# Patient Record
Sex: FEMALE | Race: WHITE | NOT HISPANIC OR LATINO | Employment: OTHER | URBAN - METROPOLITAN AREA
[De-identification: names, ages, dates, MRNs, and addresses within clinical notes are randomized per-mention and may not be internally consistent; named-entity substitution may affect disease eponyms.]

---

## 2022-04-26 ENCOUNTER — MEDICAL CORRESPONDENCE (OUTPATIENT)
Dept: HEALTH INFORMATION MANAGEMENT | Facility: CLINIC | Age: 72
End: 2022-04-26
Payer: OTHER GOVERNMENT

## 2022-05-10 ENCOUNTER — ALLIED HEALTH/NURSE VISIT (OUTPATIENT)
Dept: EDUCATION SERVICES | Facility: CLINIC | Age: 72
End: 2022-05-10
Payer: MEDICARE

## 2022-05-10 VITALS — WEIGHT: 191.8 LBS | BODY MASS INDEX: 35.3 KG/M2 | HEIGHT: 62 IN

## 2022-05-10 DIAGNOSIS — E11.9 TYPE 2 DIABETES MELLITUS (H): Primary | ICD-10-CM

## 2022-05-10 DIAGNOSIS — E66.9 OBESITY: ICD-10-CM

## 2022-05-10 PROBLEM — E78.1 PURE HYPERTRIGLYCERIDEMIA: Status: ACTIVE | Noted: 2022-05-10

## 2022-05-10 PROBLEM — E66.01 MORBID OBESITY (H): Status: ACTIVE | Noted: 2022-05-10

## 2022-05-10 PROCEDURE — G0108 DIAB MANAGE TRN  PER INDIV: HCPCS | Performed by: DIETITIAN, REGISTERED

## 2022-05-10 NOTE — LETTER
"    5/10/2022         RE: Tanja Conway  [address] [facility] [address] [place] [id number]        Dear Colleague,    Thank you for referring your patient, Tanja Conway, to the [facility]. Please see a copy of my visit note below.    Diabetes Self-Management Education & Support    Presents for: Individual review    Patient presents today with Diabetes type 2 with a complication of CKD, which is at stage 3a.   Patient also has diagnosis of obesity with BMI 35    Pt states it has been many years since diabetes education.      SUBJECTIVE/OBJECTIVE:  Presents for: Individual review  Accompanied by: Self  Diabetes education in the past 24mo: No  Focus of Visit: Monitoring, Reducing Risks, Taking Medication, Healthy Coping, Healthy Eating, Problem Solving, Being Active, Assistance w/ making life changes, Self-care behavioral goal setting  Diabetes type: Type 2  Disease course: Stable  How confident are you filling out medical forms by yourself:: Extremely  Transportation concerns: No  Difficulty affording diabetes medication?: No  Difficulty affording diabetes testing supplies?: No  Other concerns:: None  Cultural Influences/Ethnic Background:  Not  or     Diabetes Symptoms & Complications:  Fatigue: Yes (Fibromyalgia)  Neuropathy: No  Polydipsia: No  Polyphagia: No  Polyuria: No  Visual change: No  Slow healing wounds: No  Other: No  Symptom course: Stable  Weight trend: Increasing  Complications assessed today?: Yes  Autonomic neuropathy: No  CVA: No  Heart disease: No  Nephropathy: Yes (CKD Stage 3a)  Peripheral neuropathy: No  Peripheral Vascular Disease: No  Retinopathy: No    Patient Problem List and Family Medical History reviewed for relevant medical history, current medical status, and diabetes risk factors.    Vitals:  Ht 1.575 m (5' 2\")   Wt 87 kg (191 lb 12.8 oz)   BMI 35.08 kg/m    Estimated body mass index is 35.08 kg/m  as calculated from the following:    " "Height as of this encounter: 1.575 m (5' 2\").    Weight as of this encounter: 87 kg (191 lb 12.8 oz).   Last 3 BP:   BP Readings from Last 3 Encounters:   No data found for BP       History   Smoking Status     Former Smoker   Smokeless Tobacco     Not on file     Comment: quit 5 years ago       Labs:  No results found for: A1C  No results found for: GLC  No results found for: LDL  No results found for: HDL]  No results found for: GFRESTIMATED  No results found for: GFRESTBLACK  No results found for: CR  No results found for: MICROALBUMIN    Healthy Eating:  Healthy Eating Assessed Today: Yes  Cultural/Confucianism diet restrictions?: No  Breakfast: skips  Beverages: Water, Milk, Soda    Being Active:  Being Active Assessed Today: Yes  Exercise:: Currently not exercising  Barrier to exercise: None    Monitoring:  Monitoring Assessed Today: Yes  Did patient bring glucose meter to appointment? : No  Blood Glucose Meter: Unknown  Times checking blood sugar at home (number): 1  Times checking blood sugar at home (per): Week    PCP ordered freestyle aliza but this is not a covered typically with Medicare due to patient not being on insulin    Taking Medications:  Metformin 500mg one tab BID  Victoza 1.2mg daily     Taking Medication Assessed Today: Yes    Problem Solving:                 Reducing Risks:       Healthy Coping:     Patient Activation Measure Survey Score:  No flowsheet data found.    Diabetes knowledge and skills assessment:   Patient is knowledgeable in diabetes management concepts related to: Instruction today    Patient needs further education on the following diabetes management concepts: Healthy Eating, Being Active, Monitoring, Taking Medication, Problem Solving, Reducing Risks and Healthy Coping    Based on learning assessment above, most appropriate setting for further diabetes education would be: Individual setting.      INTERVENTIONS:    Education provided today on:  AADE Self-Care " Behaviors:  Diabetes Pathophysiology  Healthy Eating: carbohydrate counting, weight reduction, heart healthy diet, portion control, plate planning method and label reading  Being Active: relationship to blood glucose and describe appropriate activity program  Monitoring: individual blood glucose targets and frequency of monitoring  Taking Medication: action of prescribed medication    Opportunities for ongoing education and support in diabetes-self management were discussed.    Pt verbalized understanding of concepts discussed and recommendations provided today.       Education Materials Provided:  Goals for Your Diabetes Care, Carbohydrate Counting and My Plate Planner      ASSESSMENT:  Patient is willing to adjust some dietary habits to improve glycemic control.  Patient does not have meter or log today for evaluation will not adjust medications at this time unknown A1c     Goals Addressed as of 5/11/2022 at 1:07 PM       Taking Medication     Added 5/11/22 by Lydia Morrissey RD     Patient will take diabetes related medications daily as directed              Patient's most recent No results found for: A1C ? meeting goal of <7.0    PLAN  See Patient Instructions for co-developed, patient-stated behavior change goals.  AVS printed and provided to patient today. See Follow-Up section for recommended follow-up.      Time Spent: 60 minutes  Encounter Type: Individual    Any diabetes medication dose changes were made via the CDE Protocol and Collaborative Practice Agreement with the patient's referring provider. A copy of this encounter was shared with the provider.

## 2022-05-10 NOTE — PATIENT INSTRUCTIONS
Goals:  Practice healthy stress management and mindful eating - think are you physically hungry or are you bored, stressed, emotional etc, make of list of things to do besides eat.    Try to get good quality sleep with a goal of 7-8 hours per night.  Stay physically active daily.  Recommend working up to a total of 30 minutes on 5 days/ week.  Recommend a fitness tracker.     Eat in a healthy way- eliminate trans fats, limit saturated fats and added sugars; follow the plate method - picture above.  Keep a food record (MyFitnessPal, Loseit).    A meal is 3 or more food groups; make it colorful for better nutrition.    Total Carbohydrates (in grams) = Breakfast  30    Lunch  30    Supper  30    If desired snacks 15

## 2022-05-11 RX ORDER — LIRAGLUTIDE 6 MG/ML
INJECTION SUBCUTANEOUS
COMMUNITY
Start: 2021-09-15

## 2022-05-11 NOTE — PROGRESS NOTES
"Diabetes Self-Management Education & Support    Presents for: Individual review    Patient presents today with Diabetes type 2 with a complication of CKD, which is at stage 3a.   Patient also has diagnosis of obesity with BMI 35    Pt states it has been many years since diabetes education.      SUBJECTIVE/OBJECTIVE:  Presents for: Individual review  Accompanied by: Self  Diabetes education in the past 24mo: No  Focus of Visit: Monitoring, Reducing Risks, Taking Medication, Healthy Coping, Healthy Eating, Problem Solving, Being Active, Assistance w/ making life changes, Self-care behavioral goal setting  Diabetes type: Type 2  Disease course: Stable  How confident are you filling out medical forms by yourself:: Extremely  Transportation concerns: No  Difficulty affording diabetes medication?: No  Difficulty affording diabetes testing supplies?: No  Other concerns:: None  Cultural Influences/Ethnic Background:  Not  or     Diabetes Symptoms & Complications:  Fatigue: Yes (Fibromyalgia)  Neuropathy: No  Polydipsia: No  Polyphagia: No  Polyuria: No  Visual change: No  Slow healing wounds: No  Other: No  Symptom course: Stable  Weight trend: Increasing  Complications assessed today?: Yes  Autonomic neuropathy: No  CVA: No  Heart disease: No  Nephropathy: Yes (CKD Stage 3a)  Peripheral neuropathy: No  Peripheral Vascular Disease: No  Retinopathy: No    Patient Problem List and Family Medical History reviewed for relevant medical history, current medical status, and diabetes risk factors.    Vitals:  Ht 1.575 m (5' 2\")   Wt 87 kg (191 lb 12.8 oz)   BMI 35.08 kg/m    Estimated body mass index is 35.08 kg/m  as calculated from the following:    Height as of this encounter: 1.575 m (5' 2\").    Weight as of this encounter: 87 kg (191 lb 12.8 oz).   Last 3 BP:   BP Readings from Last 3 Encounters:   No data found for BP       History   Smoking Status     Former Smoker   Smokeless Tobacco     Not on file     " Comment: quit 5 years ago       Labs:  No results found for: A1C  No results found for: GLC  No results found for: LDL  No results found for: HDL]  No results found for: GFRESTIMATED  No results found for: GFRESTBLACK  No results found for: CR  No results found for: MICROALBUMIN    Healthy Eating:  Healthy Eating Assessed Today: Yes  Cultural/Episcopal diet restrictions?: No  Breakfast: skips  Beverages: Water, Milk, Soda    Being Active:  Being Active Assessed Today: Yes  Exercise:: Currently not exercising  Barrier to exercise: None    Monitoring:  Monitoring Assessed Today: Yes  Did patient bring glucose meter to appointment? : No  Blood Glucose Meter: Unknown  Times checking blood sugar at home (number): 1  Times checking blood sugar at home (per): Week    PCP ordered freestyle aliza but this is not a covered typically with Medicare due to patient not being on insulin    Taking Medications:  Metformin 500mg one tab BID  Victoza 1.2mg daily     Taking Medication Assessed Today: Yes    Problem Solving:                 Reducing Risks:       Healthy Coping:     Patient Activation Measure Survey Score:  No flowsheet data found.    Diabetes knowledge and skills assessment:   Patient is knowledgeable in diabetes management concepts related to: Instruction today    Patient needs further education on the following diabetes management concepts: Healthy Eating, Being Active, Monitoring, Taking Medication, Problem Solving, Reducing Risks and Healthy Coping    Based on learning assessment above, most appropriate setting for further diabetes education would be: Individual setting.      INTERVENTIONS:    Education provided today on:  AADE Self-Care Behaviors:  Diabetes Pathophysiology  Healthy Eating: carbohydrate counting, weight reduction, heart healthy diet, portion control, plate planning method and label reading  Being Active: relationship to blood glucose and describe appropriate activity program  Monitoring: individual  blood glucose targets and frequency of monitoring  Taking Medication: action of prescribed medication    Opportunities for ongoing education and support in diabetes-self management were discussed.    Pt verbalized understanding of concepts discussed and recommendations provided today.       Education Materials Provided:  Goals for Your Diabetes Care, Carbohydrate Counting and My Plate Planner      ASSESSMENT:  Patient is willing to adjust some dietary habits to improve glycemic control.  Patient does not have meter or log today for evaluation will not adjust medications at this time unknown A1c     Goals Addressed as of 5/11/2022 at 1:07 PM       Taking Medication     Added 5/11/22 by Lydia Morrissey RD     Patient will take diabetes related medications daily as directed              Patient's most recent No results found for: A1C ? meeting goal of <7.0    PLAN  See Patient Instructions for co-developed, patient-stated behavior change goals.  AVS printed and provided to patient today. See Follow-Up section for recommended follow-up.      Time Spent: 60 minutes  Encounter Type: Individual    Any diabetes medication dose changes were made via the CDE Protocol and Collaborative Practice Agreement with the patient's referring provider. A copy of this encounter was shared with the provider.